# Patient Record
Sex: FEMALE | Race: WHITE | NOT HISPANIC OR LATINO | Employment: FULL TIME | URBAN - METROPOLITAN AREA
[De-identification: names, ages, dates, MRNs, and addresses within clinical notes are randomized per-mention and may not be internally consistent; named-entity substitution may affect disease eponyms.]

---

## 2017-01-04 ENCOUNTER — HOSPITAL ENCOUNTER (OUTPATIENT)
Dept: MAMMOGRAPHY | Facility: CLINIC | Age: 52
Discharge: HOME | End: 2017-01-04
Attending: FAMILY MEDICINE

## 2017-01-04 DIAGNOSIS — Z12.31 VISIT FOR SCREENING MAMMOGRAM: ICD-10-CM

## 2017-10-25 ENCOUNTER — RECORDS - HEALTHEAST (OUTPATIENT)
Dept: ADMINISTRATIVE | Facility: OTHER | Age: 52
End: 2017-10-25

## 2017-11-14 ENCOUNTER — RECORDS - HEALTHEAST (OUTPATIENT)
Dept: ADMINISTRATIVE | Facility: OTHER | Age: 52
End: 2017-11-14

## 2017-11-26 ENCOUNTER — COMMUNICATION - HEALTHEAST (OUTPATIENT)
Dept: FAMILY MEDICINE | Facility: CLINIC | Age: 52
End: 2017-11-26

## 2017-11-26 DIAGNOSIS — M85.80 OSTEOPENIA: ICD-10-CM

## 2017-11-26 DIAGNOSIS — N95.1 PERIMENOPAUSAL SYMPTOMS: ICD-10-CM

## 2018-01-09 ENCOUNTER — COMMUNICATION - HEALTHEAST (OUTPATIENT)
Dept: FAMILY MEDICINE | Facility: CLINIC | Age: 53
End: 2018-01-09

## 2018-01-09 DIAGNOSIS — N95.1 PERIMENOPAUSAL SYMPTOMS: ICD-10-CM

## 2018-01-09 DIAGNOSIS — F41.9 ANXIETY: ICD-10-CM

## 2018-02-09 ENCOUNTER — OFFICE VISIT - HEALTHEAST (OUTPATIENT)
Dept: FAMILY MEDICINE | Facility: CLINIC | Age: 53
End: 2018-02-09

## 2018-02-09 DIAGNOSIS — F41.9 ANXIETY: ICD-10-CM

## 2018-02-09 DIAGNOSIS — M85.80 OSTEOPENIA: ICD-10-CM

## 2018-02-09 DIAGNOSIS — N95.1 PERIMENOPAUSAL SYMPTOMS: ICD-10-CM

## 2018-02-09 DIAGNOSIS — Z86.39 HISTORY OF VITAMIN D DEFICIENCY: ICD-10-CM

## 2018-02-09 DIAGNOSIS — R79.89 ELEVATED PROLACTIN LEVEL: ICD-10-CM

## 2018-02-09 DIAGNOSIS — R41.3 MEMORY CHANGES: ICD-10-CM

## 2018-02-09 DIAGNOSIS — Z00.00 HEALTH CARE MAINTENANCE: ICD-10-CM

## 2018-02-09 LAB
ALBUMIN SERPL-MCNC: 4.1 G/DL (ref 3.5–5)
ALP SERPL-CCNC: 51 U/L (ref 45–120)
ALT SERPL W P-5'-P-CCNC: 11 U/L (ref 0–45)
ANION GAP SERPL CALCULATED.3IONS-SCNC: 9 MMOL/L (ref 5–18)
AST SERPL W P-5'-P-CCNC: 17 U/L (ref 0–40)
BILIRUB SERPL-MCNC: 0.5 MG/DL (ref 0–1)
BUN SERPL-MCNC: 15 MG/DL (ref 8–22)
CALCIUM SERPL-MCNC: 9.7 MG/DL (ref 8.5–10.5)
CHLORIDE BLD-SCNC: 103 MMOL/L (ref 98–107)
CHOLEST SERPL-MCNC: 172 MG/DL
CO2 SERPL-SCNC: 28 MMOL/L (ref 22–31)
CREAT SERPL-MCNC: 0.68 MG/DL (ref 0.6–1.1)
FASTING STATUS PATIENT QL REPORTED: NO
GFR SERPL CREATININE-BSD FRML MDRD: >60 ML/MIN/1.73M2
GLUCOSE BLD-MCNC: 75 MG/DL (ref 70–125)
HDLC SERPL-MCNC: 67 MG/DL
LDLC SERPL CALC-MCNC: 93 MG/DL
POTASSIUM BLD-SCNC: 4.5 MMOL/L (ref 3.5–5)
PROLACTIN SERPL-MCNC: 28.7 NG/ML (ref 0–20)
PROT SERPL-MCNC: 7 G/DL (ref 6–8)
SODIUM SERPL-SCNC: 140 MMOL/L (ref 136–145)
TRIGL SERPL-MCNC: 60 MG/DL
TSH SERPL DL<=0.005 MIU/L-ACNC: 1.77 UIU/ML (ref 0.3–5)

## 2018-02-09 ASSESSMENT — MIFFLIN-ST. JEOR: SCORE: 1206.53

## 2018-02-12 ENCOUNTER — COMMUNICATION - HEALTHEAST (OUTPATIENT)
Dept: FAMILY MEDICINE | Facility: CLINIC | Age: 53
End: 2018-02-12

## 2018-02-12 DIAGNOSIS — R79.89 ELEVATED PROLACTIN LEVEL: ICD-10-CM

## 2018-02-12 LAB — 25(OH)D3 SERPL-MCNC: 48.9 NG/ML (ref 30–80)

## 2018-02-13 ENCOUNTER — RECORDS - HEALTHEAST (OUTPATIENT)
Dept: BONE DENSITY | Facility: CLINIC | Age: 53
End: 2018-02-13

## 2018-02-13 ENCOUNTER — RECORDS - HEALTHEAST (OUTPATIENT)
Dept: ADMINISTRATIVE | Facility: OTHER | Age: 53
End: 2018-02-13

## 2018-02-13 DIAGNOSIS — Z86.39 PERSONAL HISTORY OF OTHER ENDOCRINE, NUTRITIONAL AND METABOLIC DISEASE: ICD-10-CM

## 2018-02-13 DIAGNOSIS — M85.80 OTHER SPECIFIED DISORDERS OF BONE DENSITY AND STRUCTURE, UNSPECIFIED SITE: ICD-10-CM

## 2018-02-19 ENCOUNTER — HOSPITAL ENCOUNTER (OUTPATIENT)
Dept: MRI IMAGING | Facility: CLINIC | Age: 53
Discharge: HOME | End: 2018-02-19
Attending: FAMILY MEDICINE

## 2018-02-19 DIAGNOSIS — R79.89 ELEVATED PROLACTIN LEVEL: ICD-10-CM

## 2018-02-19 DIAGNOSIS — E22.9 HYPERFUNCTION OF PITUITARY GLAND, UNSPECIFIED (H): ICD-10-CM

## 2018-02-20 ENCOUNTER — COMMUNICATION - HEALTHEAST (OUTPATIENT)
Dept: ENDOCRINOLOGY | Facility: CLINIC | Age: 53
End: 2018-02-20

## 2018-02-20 ENCOUNTER — COMMUNICATION - HEALTHEAST (OUTPATIENT)
Dept: FAMILY MEDICINE | Facility: CLINIC | Age: 53
End: 2018-02-20

## 2018-02-20 DIAGNOSIS — D35.2 PITUITARY ADENOMA (H): ICD-10-CM

## 2018-02-20 DIAGNOSIS — R79.89 ELEVATED PROLACTIN LEVEL: ICD-10-CM

## 2018-06-05 ENCOUNTER — HOSPITAL ENCOUNTER (OUTPATIENT)
Dept: MAMMOGRAPHY | Facility: CLINIC | Age: 53
Discharge: HOME | End: 2018-06-05
Attending: FAMILY MEDICINE

## 2018-06-05 DIAGNOSIS — Z12.31 VISIT FOR SCREENING MAMMOGRAM: ICD-10-CM

## 2018-06-07 ENCOUNTER — OFFICE VISIT - HEALTHEAST (OUTPATIENT)
Dept: ENDOCRINOLOGY | Facility: CLINIC | Age: 53
End: 2018-06-07

## 2018-06-07 DIAGNOSIS — R79.89 ELEVATED PROLACTIN LEVEL: ICD-10-CM

## 2018-06-07 ASSESSMENT — MIFFLIN-ST. JEOR: SCORE: 1223.77

## 2018-06-08 RX ORDER — CABERGOLINE 0.5 MG/1
0.25 TABLET ORAL DAILY
Qty: 45 TABLET | Refills: 1 | Status: SHIPPED | OUTPATIENT
Start: 2018-06-08

## 2019-04-22 ENCOUNTER — COMMUNICATION - HEALTHEAST (OUTPATIENT)
Dept: FAMILY MEDICINE | Facility: CLINIC | Age: 54
End: 2019-04-22

## 2019-04-22 DIAGNOSIS — N95.1 PERIMENOPAUSAL SYMPTOMS: ICD-10-CM

## 2019-04-22 DIAGNOSIS — F41.9 ANXIETY: ICD-10-CM

## 2019-05-16 ENCOUNTER — COMMUNICATION - HEALTHEAST (OUTPATIENT)
Dept: ENDOCRINOLOGY | Facility: CLINIC | Age: 54
End: 2019-05-16

## 2019-05-16 DIAGNOSIS — R79.89 ELEVATED PROLACTIN LEVEL: ICD-10-CM

## 2020-05-14 ENCOUNTER — COMMUNICATION - HEALTHEAST (OUTPATIENT)
Dept: FAMILY MEDICINE | Facility: CLINIC | Age: 55
End: 2020-05-14

## 2020-05-14 DIAGNOSIS — F41.9 ANXIETY: ICD-10-CM

## 2020-05-14 DIAGNOSIS — N95.1 PERIMENOPAUSAL SYMPTOMS: ICD-10-CM

## 2020-05-14 RX ORDER — PAROXETINE 30 MG/1
30 TABLET, FILM COATED ORAL EVERY MORNING
Qty: 30 TABLET | Refills: 0 | Status: SHIPPED | OUTPATIENT
Start: 2020-05-14

## 2020-06-08 ENCOUNTER — COMMUNICATION - HEALTHEAST (OUTPATIENT)
Dept: FAMILY MEDICINE | Facility: CLINIC | Age: 55
End: 2020-06-08

## 2020-06-08 DIAGNOSIS — N95.1 PERIMENOPAUSAL SYMPTOMS: ICD-10-CM

## 2020-06-08 DIAGNOSIS — F41.9 ANXIETY: ICD-10-CM

## 2020-11-11 ENCOUNTER — RECORDS - HEALTHEAST (OUTPATIENT)
Dept: ADMINISTRATIVE | Facility: OTHER | Age: 55
End: 2020-11-11

## 2021-05-27 ENCOUNTER — RECORDS - HEALTHEAST (OUTPATIENT)
Dept: ADMINISTRATIVE | Facility: CLINIC | Age: 56
End: 2021-05-27

## 2021-05-28 NOTE — TELEPHONE ENCOUNTER
RN cannot approve Refill Request    RN can NOT refill this medication overdue for office visits and/or labs.    Chris Cantu, [facility] Connection Triage/Med Refill 4/22/2019    Requested Prescriptions   Pending Prescriptions Disp Refills     PARoxetine (PAXIL) 30 MG tablet [Pharmacy Med Name: PAROXETINE HCL 30 MG TABLET] 90 tablet 3     Sig: TAKE 1 TABLET (30 MG TOTAL) BY MOUTH EVERY MORNING.       [facility] Refill Protocol  Failed - 4/22/2019 12:36 PM        Failed - PCP or prescribing provider visit in last year     Last office visit with prescriber/PCP: Visit date not found OR same dept: Visit date not found OR same specialty: 10/28/2016 Alexia Romero DO  Last physical: 2/9/2018 Last MTM visit: Visit date not found   Next visit within 3 mo: Visit date not found  Next physical within 3 mo: Visit date not found  Prescriber OR PCP: Jennifer Patel MD  Last diagnosis associated with med order: 1. Perimenopausal symptoms  - PARoxetine (PAXIL) 30 MG tablet [Pharmacy Med Name: PAROXETINE HCL 30 MG TABLET]; Take 1 tablet (30 mg total) by mouth every morning.  Dispense: 90 tablet; Refill: 3    2. Anxiety  - PARoxetine (PAXIL) 30 MG tablet [Pharmacy Med Name: PAROXETINE HCL 30 MG TABLET]; Take 1 tablet (30 mg total) by mouth every morning.  Dispense: 90 tablet; Refill: 3    If protocol passes may refill for 12 months if within 3 months of last provider visit (or a total of 15 months).

## 2021-06-01 VITALS — WEIGHT: 142.8 LBS | BODY MASS INDEX: 24.38 KG/M2 | HEIGHT: 64 IN

## 2021-06-01 VITALS — HEIGHT: 64 IN | BODY MASS INDEX: 23.73 KG/M2 | WEIGHT: 139 LBS

## 2021-06-08 NOTE — TELEPHONE ENCOUNTER
Requests refill for paxil. Will send one month to avoid withdrawal, but patient needs appt with PCP. She last saw Dr. Patel at [facility]. Please call and schedule patient for further refills.     Lou Guzman, Pharm.D., [facility]  Medication Therapy Management Pharmacist  [facility]

## 2021-06-15 NOTE — PROGRESS NOTES
Patient ID: Carina cShumacher is a 52 y.o. female.  Patient presents today for routine physical examination.  Additional concerns are as detailed below:    Elevated prolactin:  -Recheck today  -Make a plan based on the findings and call patient when the results returns  -This could be the reason the patient is expensing osteopenia in such a young age    Anxiety:  -Controlled under current dose of Paxil 30 mg, no changes to medication made today  -Some concern that the memory problems could be due to attentional difficulties, but her attention seems to have improved on the Paxil    Memory concerns:  -We will have patient return for a Winn examination    Menopause:  -Patient has no current symptoms that she is on hormonal replacement therapy for bone health  -She is having intermittent spotting, she is on progesterone and estrogen-had an extensive discussion about the risks and benefits of hormone replacement therapy, happy to continue the current plan, would consider discontinuing if the bleeding becomes uncomfortable or patient surpasses age 55      Healthcare maintenance:  - CMP, lipid panel, TSH pending  - pt's last pap was ASCUS with neg HPV in 2015, repeat Oct 2018  - mammogram is scheduled  - up to date on colonoscopy  - influenza immunization administered today    Problem List Items Addressed This Visit        ENT/CARD/PULM/ENDO Problems    Elevated prolactin level    Relevant Orders    Prolactin       Other    Anxiety    Relevant Medications    PARoxetine (PAXIL) 30 MG tablet      Other Visit Diagnoses     Health care maintenance    -  Primary    Relevant Orders    Influenza, Seasonal,Quad Inj, 36+ MOS (Completed)    Lipid [place] FASTING    Comprehensive Metabolic Panel    Thyroid Stimulating Hormone (TSH)    Perimenopausal symptoms        Relevant Medications    PARoxetine (PAXIL) 30 MG tablet    Osteopenia        Relevant Orders    Vitamin D, Total (25-Hydroxy)    DXA Bone Density Scan    History of  vitamin D deficiency        Relevant Orders    Vitamin D, Total (25-Hydroxy)    DXA Bone Density Scan    Memory changes        Relevant Orders    Thyroid Stimulating Hormone (TSH)        Total time spent with patient was 45 minutes with greater than 50% spent in face-to-face counseling regarding the above plan.    This note has been dictated using voice recognition software. Any grammatical or context distortions are unintentional and inherent to the the software.     Jennifer Patel MD  Family Medicine [facility]    Subjective:     Carina Schumacher is a 52 y.o. female who presents to clinic for routine physical.    Concerns today include:    Memory loss: Patient has had this concern for several years.  She endorses memory issues daily, and word finding difficulty.  She had hopes that this would be improved with hormonal supplementation status post menopause, but has not noticed a significant difference.  She is Arty employing compensatory strategies such as taking pictures of things so that if she does not remember the label she has something to refer to.  Feels as though she is in a fog.    Menopause: Patient did have at least one year of absent menses.  She was then initiated on a hormonal supplement but was having some spotting.  She is now on 2 different medications, one estrogen one progesterone to help with this.  She is still spotting.  She preferred to use this medication as she was hopefully would help with her memory concerns, she was having night sweats, and she wants good bone health.  She does have a history of osteopenia.      Osteopenia: His most recent vitamin D reading was normal, but a previous one has been very low.  She is currently on vitamin D supplementation, but low dose.  She is currently supplements calcium.  She does have a history of prolactinemia.  She has had one baseline DEXA scan.    Prolactinemia: Unclear etiology, no significant medication management, no recent  "monitoring.    Anxiety: Well controlled on new dose of Paxil at 30 mg      Past Medical History, Family History, and Social History reviewed.     Review of systems is as stated in HPI.  Patient endorses: memory loss  The remainder of the 10 system review is otherwise negative.    Objective:     Medications:  Current Outpatient Prescriptions   Medication Sig     calcium carbonate-vitamin D3 (CALCIUM 500 + D) 500 mg(1,250mg) -400 unit Tab      estradiol (ESTRACE) 2 MG tablet TAKE 1 TABLET (2 MG TOTAL) BY MOUTH DAILY.     GLUCOSAMINE HCL/CHONDR MATSON A NA (OSTEO BI-FLEX ORAL) Take by mouth.     medroxyPROGESTERone (PROVERA) 2.5 MG tablet TAKE 1 TABLET (2.5 MG TOTAL) BY MOUTH DAILY.     PARoxetine (PAXIL) 30 MG tablet Take 1 tablet (30 mg total) by mouth every morning.     OMEGA-3 FATTY ACIDS/FISH OIL (OMEGA 3 FISH OIL ORAL) Take 2 capsules by mouth daily.       Allergies:  No Known Allergies    Tobacco:   reports that she has never smoked. She has never used smokeless tobacco.      Ability to successfully perform ADLs: Yes    Patient Care Team:   PCP: Jennifer Patel MD    LDL Calculated (mg/dL)   Date Value   10/28/2016 128   10/19/2015 120   10/14/2014 105        Immunization History   Administered Date(s) Administered     DT (pediatric) 08/24/2004     Influenza, inj, historic,unspecified 12/27/2007, 10/26/2015, 09/07/2016     Influenza, seasonal,quad inj 36+ mos 10/26/2015, 09/07/2016, 02/09/2018     Influenza, seasonal,quad inj 6-35 mos 09/06/2012, 09/23/2013, 10/23/2014     Influenza,seasonal, Inj IIV3 09/06/2012, 09/23/2013, 10/23/2014     Td, Adult, Absorbed 08/24/2004     Td,adult,historic,unspecified 08/24/2004     Tdap 08/27/2012     There are no preventive care reminders to display for this patient.     Physical Exam  /70  Pulse 63  Ht 5' 3.75\" (1.619 m)  Wt 139 lb (63 kg)  LMP 07/26/2015 Comment: Cycles are irregular  SpO2 100%  BMI 24.05 kg/m2      Gen: Alert, NAD, appears stated age, normal " hygiene   Eyes: conjunctivae without injection, sclera clear, EOMI  ENT/mouth: nares clear, septum midline, absent rhinorrhea, throat without injection, neck is supple, no thyroid enlargement  CV: RRR, no murmur appreciated, pedal edema absent bilaterally  Resp: CTAB, no wheezes, rales or ronchi  ABD: normoactive, non-tender to palpation, nondistended  MSK: grossly full range of motion in all joints, no obvious deformity  Neuro: CN II-XII grossly intact, no deficits in coordination  Psych: no apparent hallucinations or delusions, no pressured speech; alert, oriented x3  SKIN: dry and without lesions  Heme/lymph: no pallor, no active bleeding/bruising, no adenopathy appreciated

## 2021-06-16 PROBLEM — F41.9 ANXIETY: Status: ACTIVE | Noted: 2018-01-10

## 2021-06-18 NOTE — PROGRESS NOTES
Progress Note    Reason for Visit:  Chief Complaint     Pituitary Problem          Progress Note:    HPI:   This patient seen saltation at the request of the primary care physician because of pituitary microadenoma.    The patient serum prolactin was monitored by her gynecologist for many years initially it was 41.5 more recently is 28.7.    The patient has been asymptomatic denying any headaches or galactorrhea she has not taken any medications that would induce high serum prolactin apart from occasionally Thompson.    She is denying any headache she is  with 2 biological children and 2 stepchildren.    She has been on estrogen 2 mg plus medroxyprogesterone 2.5 mg for 1 year but stopped but he stopped that 2 months ago.    She is taking calcium 1 pill a day.    She had stress fracture of her left foot from from running into [address] but no fractures since.    The patient had an MRI which showed that she has 7 mm left pituitary microadenoma.  Component      Latest Ref Rng & Units 9/7/2016 10/28/2016 2/9/2018   Chloride      98 - 107 mmol/L 106  103   Creatinine      0.60 - 1.10 mg/dL 0.71  0.68   CO2      22 - 31 mmol/L 31  28   GFR MDRD Non Af Amer      >60 mL/min/1.73m2 >60  >60   GFR MDRD Af Amer      >60 mL/min/1.73m2 >60  >60   Cholesterol      <=199 mg/dL  206 (H) 172   Triglycerides      <=149 mg/dL  77 60   HDL Cholesterol      >=50 mg/dL  63 67   LDL Calculated      <=129 mg/dL  128 93   Patient Fasting > 8hrs?        Yes No   TSH      0.30 - 5.00 uIU/mL 2.04  1.77   Vitamin D, Total (25-Hydroxy)      30.0 - 80.0 ng/mL   48.9   Prolactin      0.0 - 20.0 ng/mL   28.7 (H)   FSH      mIU/mL          HEAD MRI WITHOUT AND WITH IV CONTRAST  2/19/2018 2:56 PM     INDICATION: Prolactin elevation.  TECHNIQUE: Head MRI without and with intravenous contrast.  CONTRAST: Gadavist 7 mL.  COMPARISON: None.     FINDINGS: At the left side of the pituitary there is a mildly T2 hypointense heterogeneously enhancing  lesion with areas of internal relative hypoenhancement. This measures approximately 4 x 7 x 5 mm in AP, transverse, and craniocaudal dimensions   respectively. Given the patient's clinical history of elevated prolactin, this is most concerning for a pituitary microadenoma. This contributes to a slightly nodular contour of the superior left lateral aspect of the pituitary gland, but otherwise   demonstrates no suprasellar extension. Distal internal carotid artery flow voids are preserved. Cavernous sinuses are not involved. Infundibulum is in the midline. Optic chiasm is unremarkable.     No finding for acute infarct and no abnormally enhancing supratentorial mass. No significant parenchymal signal abnormality. Normal ventricular size. Major intracranial vascular flow voids are preserved. Mild mucosal thickening in the maxillary sinuses.   Remainder negative.     IMPRESSION:   CONCLUSION:  1.  Heterogeneously enhancing left pituitary lesion with areas of relative hypoenhancement, measuring 4 x 7 x 5 mm. Given the clinical history of elevated prolactin, this is most concerning for a pituitary microadenoma. This demonstrates no significant   suprasellar extension.         Patient Profile:  52 y.o. female, postmenopausal, is here for the follow up bone density test.   History of fractures - None. Family history of osteoporosis - None.  Family history of hip fracture: None. Smoking history - No. Osteoporosis treatment past -  No. Osteoporosis treatment current - Yes;  HRT.  Chronic medical problems - None. High risk medications -  Depo-provera;  Yes, in the Past.        Assessment:     1. The spine bone density L1-L4 with T-score -1.8, stable compared to 2015.  2. Femoral bone densities show left femoral neck T- score -2.0 and right femoral neck T-score -2.0, stable.  3. Trabecular bone score indicates moderate trabecular bone architecture.        52 y.o. female with LOW BONE DENSITY (OSTEOPENIA) and LOW fracture risk,  adjusted for the TBS, with major osteoporotic fracture risk 7.8% and hip fracture risk 1.0%.          Review of Systems:    Nervous System: No headache, dizziness, fainting or memory loss. No tingling sensation of hand or feet.  Ears: No hearing loss or ringing in the ears  Eyes: No blurring of vision, redness, itching or dryness.  Nose: No nosebleed or loss of smell  Mouth: No mouth sores or loss of taste  Throat: No hoarseness or difficulty swallowing  Neck: No enlarged thyroid or lymph nodes.  Heart: No chest pain, palpitation or irregular heartbeat. No swelling of hands or feet  Lungs: No shortness of breath, cough, night sweats, wheezing or hemoptysis.  Gastrointestinal: No nausea or vomiting, constipation or diarrhea.  No acid reflux, abdominal pain or blood in stools.  Kidney/Bladdr: No polyuria, polydipsia, nocturia or hematuria.  Genital/Sexual: No loss of libido  Skin: No rash, hair loss or hirsutism.  No abnormal striae  Muscles/Joints/Bones: No morning stiffness, muscle aches and pain or loss of height.    Current Medications:  Current Outpatient Prescriptions   Medication Sig     calcium carbonate-vitamin D3 (CALCIUM 500 + D) 500 mg(1,250mg) -400 unit Tab      GLUCOSAMINE HCL/CHONDR MATSON A NA (OSTEO BI-FLEX ORAL) Take by mouth.     PARoxetine (PAXIL) 30 MG tablet Take 1 tablet (30 mg total) by mouth every morning.       Patients Active Problems:  Patient Active Problem List   Diagnosis     Incomplete Right Bundle Branch Block     Elevated prolactin level     Anxiety       History:   reports that she has never smoked. She has never used smokeless tobacco. She reports that she drinks about 1.8 oz of alcohol per week  She reports that she does not use illicit drugs.   reports that she has never smoked. She has never used smokeless tobacco. She reports that she drinks about 1.8 oz of alcohol per week  She reports that she does not use illicit drugs.  History   Smoking Status     Never Smoker   Smokeless  "Tobacco     Never Used      reports that she has never smoked. She has never used smokeless tobacco. She reports that she drinks about 1.8 oz of alcohol per week  She reports that she does not use illicit drugs.  History   Sexual Activity     Sexual activity: Not on file     Past Medical History:   Diagnosis Date     Osteopenia     wants ERT for bones/ vag atrophy.     No family history on file.  Past Medical History:   Diagnosis Date     Osteopenia     wants ERT for bones/ vag atrophy.     Past Surgical History:   Procedure Laterality Date     BREAST BIOPSY Left 1994    benign       Vitals   height is 5' 3.75\" (1.619 m) and weight is 142 lb 12.8 oz (64.8 kg). Her blood pressure is 110/70.         Exam  General appearance: The patient looked well, not in acute distress.  Eyes: no evidence of thyroid eye disease.   Retinal exam: No evidence of diabetic retinopathy.  Mouth and Throat: Normal  Neck: No evidence of thyromegaly, enlarged lymph node or tenderness  Chest: Trachea is central. Chest is clear to auscultation and percussion. Breat sounds are normal.  Cardiovascular exam: JVP is not raised. Heart sounds are normal, no murmurs or rub  Peripheral pulses are palpable.   Abdomen: No masses or tenderness.    Back: No vertebral tenderness or kyphosis.  Extremities: No evidence of leg edema.   Skin: Normal to touch.  No abnormal striae  Neurologic exam:  Visual fields are intact by confrontation, grossly intact. No evidence of peripheral neuropathy.  Detailed foot exam normal.        Diagnosis:  No diagnosis found.    Orders:   No orders of the defined types were placed in this encounter.        Assessment and Plan: Pituitary microadenoma I have discussed the pathophysiology of the disease and I did advise her that the prolactin level is only mildly elevated there is no evidence of optic cast compression she is asymptomatic apart from the thing that is concerning her his memory loss the TSH is 1.77.  After prolonged " discussion we agreed to start Dostinex 0.25 mg once a week at bedtime to see if that would help her memory issues.    She is denying any galactorrhea or breast lumps.    She takes Paxil 30 mg I advised her not to take HRT anymore.  She has occasional sweating at night.    She had a bone DEXA scan showed T score at the spine is -1.8 at the left and right hip -2 she did that because of the stress fracture but at this stage I think reducing the prolactin is not going to help much.    We will try that Dostinex for 3 months.  That.  Patient does not smoke drinks socially.    Family history is not significant she will return to clinic in 6 month.    I have reviewed and ordered clinical lab test    I have reviewed and ordered radiology tests.    I have reviewed and ordered her medication as required.    I have reviewed her test results and advised with the performing physician.    I have reviewed the patient's old records.    I have reviewed and summarized the patient old records.    I did spend 60 minutes with the patient more than 50% was spent on counseling and managing her care.

## 2021-07-21 ENCOUNTER — RECORDS - HEALTHEAST (OUTPATIENT)
Dept: ADMINISTRATIVE | Facility: CLINIC | Age: 56
End: 2021-07-21

## 2021-07-22 ENCOUNTER — RECORDS - HEALTHEAST (OUTPATIENT)
Dept: FAMILY MEDICINE | Facility: CLINIC | Age: 56
End: 2021-07-22

## 2021-07-22 DIAGNOSIS — Z12.31 OTHER SCREENING MAMMOGRAM: ICD-10-CM

## 2021-08-02 ENCOUNTER — ANCILLARY PROCEDURE (OUTPATIENT)
Dept: BONE DENSITY | Facility: CLINIC | Age: 56
End: 2021-08-02
Attending: INTERNAL MEDICINE
Payer: COMMERCIAL

## 2021-08-02 DIAGNOSIS — M80.80XD OTHER OSTEOPOROSIS WITH CURRENT PATHOLOGICAL FRACTURE WITH ROUTINE HEALING, SUBSEQUENT ENCOUNTER: ICD-10-CM

## 2021-08-02 DIAGNOSIS — S22.42XD CLOSED FRACTURE OF MULTIPLE RIBS OF LEFT SIDE WITH ROUTINE HEALING, SUBSEQUENT ENCOUNTER: ICD-10-CM

## 2021-08-02 PROCEDURE — 77080 DXA BONE DENSITY AXIAL: CPT | Mod: TC | Performed by: RADIOLOGY

## 2021-08-22 ENCOUNTER — HEALTH MAINTENANCE LETTER (OUTPATIENT)
Age: 56
End: 2021-08-22

## 2021-10-17 ENCOUNTER — HEALTH MAINTENANCE LETTER (OUTPATIENT)
Age: 56
End: 2021-10-17

## 2022-08-03 ENCOUNTER — ANCILLARY PROCEDURE (OUTPATIENT)
Dept: BONE DENSITY | Facility: CLINIC | Age: 57
End: 2022-08-03
Attending: INTERNAL MEDICINE
Payer: COMMERCIAL

## 2022-08-03 DIAGNOSIS — M81.8 OTHER OSTEOPOROSIS WITHOUT CURRENT PATHOLOGICAL FRACTURE: ICD-10-CM

## 2022-08-03 DIAGNOSIS — Z87.81 HISTORY OF RIB FRACTURE: ICD-10-CM

## 2022-08-03 PROCEDURE — 77080 DXA BONE DENSITY AXIAL: CPT | Mod: TC | Performed by: RADIOLOGY

## 2022-10-02 ENCOUNTER — HEALTH MAINTENANCE LETTER (OUTPATIENT)
Age: 57
End: 2022-10-02

## 2023-10-21 ENCOUNTER — HEALTH MAINTENANCE LETTER (OUTPATIENT)
Age: 58
End: 2023-10-21

## 2024-10-14 ENCOUNTER — ANCILLARY PROCEDURE (OUTPATIENT)
Dept: BONE DENSITY | Facility: CLINIC | Age: 59
End: 2024-10-14
Attending: INTERNAL MEDICINE
Payer: COMMERCIAL

## 2024-10-14 DIAGNOSIS — M81.8 OTHER OSTEOPOROSIS WITHOUT CURRENT PATHOLOGICAL FRACTURE: ICD-10-CM

## 2024-10-14 PROCEDURE — 77080 DXA BONE DENSITY AXIAL: CPT | Mod: TC | Performed by: PHYSICIAN ASSISTANT

## 2024-10-14 PROCEDURE — 77091 TBS TECHL CALCULATION ONLY: CPT | Performed by: PHYSICIAN ASSISTANT

## 2024-12-14 ENCOUNTER — HEALTH MAINTENANCE LETTER (OUTPATIENT)
Age: 59
End: 2024-12-14